# Patient Record
Sex: FEMALE | Race: ASIAN | URBAN - METROPOLITAN AREA
[De-identification: names, ages, dates, MRNs, and addresses within clinical notes are randomized per-mention and may not be internally consistent; named-entity substitution may affect disease eponyms.]

---

## 2022-02-19 ENCOUNTER — OFFICE VISIT (OUTPATIENT)
Dept: URBAN - METROPOLITAN AREA CLINIC 3 | Facility: CLINIC | Age: 62
End: 2022-02-19
Payer: COMMERCIAL

## 2022-02-19 DIAGNOSIS — H52.223 REGULAR ASTIGMATISM, BILATERAL: ICD-10-CM

## 2022-02-19 DIAGNOSIS — H43.313 VITREOUS MEMBRANES AND STRANDS, BILATERAL: ICD-10-CM

## 2022-02-19 DIAGNOSIS — E11.9 TYPE 2 DIABETES MELLITUS WITHOUT COMPLICATIONS: Primary | ICD-10-CM

## 2022-02-19 DIAGNOSIS — H18.413 ARCUS SENILIS, BILATERAL: ICD-10-CM

## 2022-02-19 DIAGNOSIS — H25.13 AGE-RELATED NUCLEAR CATARACT, BILATERAL: ICD-10-CM

## 2022-02-19 PROCEDURE — 99204 OFFICE O/P NEW MOD 45 MIN: CPT | Performed by: OPTOMETRIST

## 2022-02-19 ASSESSMENT — VISUAL ACUITY
OD: 20/20
OS: 20/20

## 2022-02-19 ASSESSMENT — INTRAOCULAR PRESSURE
OD: 10
OS: 9

## 2022-02-19 NOTE — IMPRESSION/PLAN
Impression: Type 2 diabetes mellitus without complications: E15.8. Plan: Discussed the pathophysiology of diabetes and its effect on the eye. Stressed the importance of strong glucose control. Advised of importance of scheduled dilated examinations, and to contact us immediately for any problems or concerns. Patient was referred to a retina specialist for further evaluation and management.